# Patient Record
Sex: MALE | Race: WHITE | NOT HISPANIC OR LATINO | URBAN - METROPOLITAN AREA
[De-identification: names, ages, dates, MRNs, and addresses within clinical notes are randomized per-mention and may not be internally consistent; named-entity substitution may affect disease eponyms.]

---

## 2019-07-14 ENCOUNTER — APPOINTMENT (OUTPATIENT)
Dept: GENERAL RADIOLOGY | Facility: HOSPITAL | Age: 34
End: 2019-07-14

## 2019-07-14 ENCOUNTER — HOSPITAL ENCOUNTER (EMERGENCY)
Facility: HOSPITAL | Age: 34
Discharge: HOME | End: 2019-07-14
Attending: EMERGENCY MEDICINE | Admitting: EMERGENCY MEDICINE

## 2019-07-14 VITALS
HEIGHT: 74 IN | RESPIRATION RATE: 18 BRPM | WEIGHT: 26 LBS | OXYGEN SATURATION: 99 % | BODY MASS INDEX: 3.34 KG/M2 | SYSTOLIC BLOOD PRESSURE: 129 MMHG | HEART RATE: 99 BPM | DIASTOLIC BLOOD PRESSURE: 92 MMHG | TEMPERATURE: 98.4 F

## 2019-07-14 DIAGNOSIS — S82.002A CLOSED NONDISPLACED FRACTURE OF LEFT PATELLA, UNSPECIFIED FRACTURE MORPHOLOGY, INITIAL ENCOUNTER: Primary | ICD-10-CM

## 2019-07-14 DIAGNOSIS — M25.462 EFFUSION OF LEFT KNEE: ICD-10-CM

## 2019-07-14 PROCEDURE — 73562 X-RAY EXAM OF KNEE 3: CPT

## 2019-07-14 PROCEDURE — 99283 EMERGENCY DEPT VISIT LOW MDM: CPT

## 2019-07-14 RX ORDER — HYDROCODONE BITARTRATE AND ACETAMINOPHEN 5; 325 MG/1; MG/1
1 TABLET ORAL ONCE
Status: COMPLETED | OUTPATIENT
Start: 2019-07-14 | End: 2019-07-14

## 2019-07-14 RX ORDER — HYDROCODONE BITARTRATE AND ACETAMINOPHEN 5; 325 MG/1; MG/1
1 TABLET ORAL EVERY 6 HOURS PRN
Qty: 20 TABLET | Refills: 0 | Status: SHIPPED | OUTPATIENT
Start: 2019-07-14

## 2019-07-14 RX ADMIN — HYDROCODONE BITARTRATE AND ACETAMINOPHEN 1 TABLET: 5; 325 TABLET ORAL at 08:53

## 2019-07-14 NOTE — ED PROVIDER NOTES
EMERGENCY DEPARTMENT ENCOUNTER    CHIEF COMPLAINT  Chief Complaint: left knee pain  History given by: patient  History limited by: none  Room Number: 01/01  PMD: Khoi Spring MD      HPI:  Pt is a 33 y.o. male who presents complaining of L knee pain that started last night after pt tripped and fell, landing on his L knee on tile shelbi. Pt also c/o mild tingling and numbness in his L foot last night that is currently resolved. Pt has no other complaints at this time.     Duration: since last night  Onset: sudden  Timing: constant  Location: L knee  Radiation: none  Quality: L knee pain  Intensity/Severity: moderate  Progression: unchanged  Associated Symptoms: mild tingling and numbness in the L foot last night (resolved)  Aggravating Factors: none  Alleviating Factors: none  Previous Episodes: none  Treatment before arrival: none    PAST MEDICAL HISTORY  Active Ambulatory Problems     Diagnosis Date Noted   • No Active Ambulatory Problems     Resolved Ambulatory Problems     Diagnosis Date Noted   • No Resolved Ambulatory Problems     No Additional Past Medical History       PAST SURGICAL HISTORY  No past surgical history on file.    FAMILY HISTORY  No family history on file.    SOCIAL HISTORY  Social History     Socioeconomic History   • Marital status:      Spouse name: Not on file   • Number of children: Not on file   • Years of education: Not on file   • Highest education level: Not on file       ALLERGIES  Sulfa antibiotics    REVIEW OF SYSTEMS  Review of Systems   Constitutional: Negative for fever.   Respiratory: Negative for shortness of breath.    Cardiovascular: Negative for chest pain.   Musculoskeletal: Positive for arthralgias (L knee pain).   Neurological: Positive for numbness (tingling and numbness in L foot last night, resolved).   All other ROS are otherwise negative unless stated above.     PHYSICAL EXAM  ED Triage Vitals [07/14/19 0749]   Temp Heart Rate Resp BP SpO2   98.4 °F (36.9  °C) 107 18 -- 99 %      Temp src Heart Rate Source Patient Position BP Location FiO2 (%)   Tympanic -- -- -- --       Physical Exam   Constitutional: No distress.   HENT:   Head: Normocephalic and atraumatic.   Eyes: EOM are normal.   Neck: Normal range of motion.   Pulmonary/Chest: No respiratory distress.   Abdominal: There is no tenderness.   Musculoskeletal: He exhibits no edema.   There is diffuse anterior tenderness with suprapatellar swelling and effusion to the L knee. Joint appears intact. No obvious deformity. Pt is neurovascularly intact in that extremity.   Neurological: He is alert.   Skin: Skin is warm and dry.   Nursing note and vitals reviewed.      RADIOLOGY  XR Knee 3 View Left   Final Result           I ordered the above noted radiological studies. Interpreted by radiologist. Reviewed by me in PACS. XR L Knee - avulsion fx of patella and ligamentous injury with effusion.       PROCEDURES  Procedures      PROGRESS AND CONSULTS     0757 XR L Knee ordered for further evaluation. Stan report ordered.     0847 Rechecked pt. Pt is resting comfortably. Notified pt of XR L Knee results (avulsion fx of patella and ligamentous injury with effusion). Discussed the plan to place the pt in a knee immobilizer and then discharge the pt home with prescriptions for norco. I instructed the pt to follow up with Dr. Rodriguez for an appointment as soon as possible. Pt understands and agrees with the plan, all questions answered.    0849 Norco ordered for pain management.         MEDICAL DECISION MAKING  Results were reviewed/discussed with the patient and they were also made aware of online access. Pt also made aware that some labs, such as cultures, will not be resulted during ER visit and follow up with PMD is necessary.     MDM  Number of Diagnoses or Management Options  Closed nondisplaced fracture of left patella, unspecified fracture morphology, initial encounter- Avulsion fracture:   Effusion of left knee:       Amount and/or Complexity of Data Reviewed  Tests in the radiology section of CPT®: reviewed and ordered (XR L Knee - avulsion fx of patella and ligamentous injury with effusion)  Independent visualization of images, tracings, or specimens: yes           DIAGNOSIS  Final diagnoses:   Closed nondisplaced fracture of left patella, unspecified fracture morphology, initial encounter- Avulsion fracture   Effusion of left knee       DISPOSITION  DISCHARGE    Patient discharged in stable condition.    Reviewed implications of results, diagnosis, meds, responsibility to follow up, warning signs and symptoms of possible worsening, potential complications and reasons to return to ER.    Patient/Family voiced understanding of above instructions.    Discussed plan for discharge, as there is no emergent indication for admission. Patient referred to primary care provider for BP management due to today's BP. Pt/family is agreeable and understands need for follow up and repeat testing.  Pt is aware that discharge does not mean that nothing is wrong but it indicates no emergency is present that requires admission and they must continue care with follow-up as given below or physician of their choice.     FOLLOW-UP  Fredy Rodriguez MD  [facility] [place] [address]  [phone]    Schedule an appointment as soon as possible for a visit   left knee injury         Medication List      New Prescriptions    HYDROcodone-acetaminophen 5-325 MG per tablet  Commonly known as:  NORCO  Take 1 tablet by mouth Every 6 (Six) Hours As Needed for Moderate Pain .          ANASTASIIA query complete. Treatment plan to include limited course of prescribed  controlled substance. Risks including addiction, benefits, and alternatives presented to patient.       Latest Documented Vital Signs:  As of 8:52 AM  BP- 129/92 HR- 99 Temp- 98.4 °F (36.9 °C) (Tympanic) O2 sat- 99%    --  Documentation assistance provided by sandeep Serrato  Christine for Dr. Stanley.  Information recorded by the scribe was done at my direction and has been verified and validated by me.     Rojelio King  07/14/19 0874       Eliu Stanley MD  07/14/19 9563

## 2019-07-14 NOTE — ED NOTES
Pt reports tripping and falling landing on tile floor injuring left knee last night.      Marleni Marmolejo RN  07/14/19 5727

## 2019-07-14 NOTE — ED NOTES
"Pt states \"Last night I tripped and came down on my left knee pretty hard.\"     Arielle Lawler, RN  07/14/19 0796    "